# Patient Record
Sex: MALE | Race: BLACK OR AFRICAN AMERICAN | Employment: OTHER | URBAN - NONMETROPOLITAN AREA
[De-identification: names, ages, dates, MRNs, and addresses within clinical notes are randomized per-mention and may not be internally consistent; named-entity substitution may affect disease eponyms.]

---

## 2024-08-28 ENCOUNTER — HOSPITAL ENCOUNTER (EMERGENCY)
Facility: HOSPITAL | Age: 64
Discharge: HOME | End: 2024-08-28
Attending: EMERGENCY MEDICINE
Payer: OTHER GOVERNMENT

## 2024-08-28 VITALS
TEMPERATURE: 98.8 F | SYSTOLIC BLOOD PRESSURE: 176 MMHG | OXYGEN SATURATION: 100 % | HEART RATE: 80 BPM | DIASTOLIC BLOOD PRESSURE: 104 MMHG | RESPIRATION RATE: 18 BRPM

## 2024-08-28 DIAGNOSIS — S39.012A STRAIN OF LUMBAR REGION, INITIAL ENCOUNTER: Primary | ICD-10-CM

## 2024-08-28 PROCEDURE — 96372 THER/PROPH/DIAG INJ SC/IM: CPT

## 2024-08-28 PROCEDURE — 6360000002 HC RX W HCPCS: Performed by: EMERGENCY MEDICINE

## 2024-08-28 PROCEDURE — 99284 EMERGENCY DEPT VISIT MOD MDM: CPT

## 2024-08-28 PROCEDURE — 6370000000 HC RX 637 (ALT 250 FOR IP): Performed by: EMERGENCY MEDICINE

## 2024-08-28 RX ORDER — KETOROLAC TROMETHAMINE 15 MG/ML
15 INJECTION, SOLUTION INTRAMUSCULAR; INTRAVENOUS ONCE
Status: COMPLETED | OUTPATIENT
Start: 2024-08-28 | End: 2024-08-28

## 2024-08-28 RX ORDER — PREDNISONE 20 MG/1
40 TABLET ORAL DAILY
Qty: 8 TABLET | Refills: 0 | Status: SHIPPED | OUTPATIENT
Start: 2024-08-28 | End: 2024-09-01

## 2024-08-28 RX ORDER — ACETAMINOPHEN 500 MG
1000 TABLET ORAL
Status: COMPLETED | OUTPATIENT
Start: 2024-08-28 | End: 2024-08-28

## 2024-08-28 RX ORDER — PREDNISONE 20 MG/1
40 TABLET ORAL ONCE
Status: COMPLETED | OUTPATIENT
Start: 2024-08-28 | End: 2024-08-28

## 2024-08-28 RX ORDER — KETOROLAC TROMETHAMINE 10 MG/1
10 TABLET, FILM COATED ORAL EVERY 6 HOURS PRN
Qty: 20 TABLET | Refills: 0 | Status: SHIPPED | OUTPATIENT
Start: 2024-08-28

## 2024-08-28 RX ADMIN — PREDNISONE 40 MG: 20 TABLET ORAL at 12:42

## 2024-08-28 RX ADMIN — KETOROLAC TROMETHAMINE 15 MG: 15 INJECTION, SOLUTION INTRAMUSCULAR; INTRAVENOUS at 12:42

## 2024-08-28 RX ADMIN — ACETAMINOPHEN 1000 MG: 500 TABLET ORAL at 12:42

## 2024-08-28 ASSESSMENT — PAIN SCALES - GENERAL: PAINLEVEL_OUTOF10: 5

## 2024-08-28 ASSESSMENT — PAIN - FUNCTIONAL ASSESSMENT
PAIN_FUNCTIONAL_ASSESSMENT: PREVENTS OR INTERFERES SOME ACTIVE ACTIVITIES AND ADLS
PAIN_FUNCTIONAL_ASSESSMENT: 0-10

## 2024-08-28 ASSESSMENT — PAIN DESCRIPTION - PAIN TYPE: TYPE: ACUTE PAIN

## 2024-08-28 ASSESSMENT — PAIN DESCRIPTION - LOCATION: LOCATION: BACK

## 2024-08-28 ASSESSMENT — PAIN DESCRIPTION - FREQUENCY: FREQUENCY: CONTINUOUS

## 2024-08-28 ASSESSMENT — PAIN DESCRIPTION - ONSET: ONSET: ON-GOING

## 2024-08-28 NOTE — ED TRIAGE NOTES
PT reports lower back pain that is ongoing, pt denies any injury and reports he was seen at urgent care Monday and rx muscle relaxer. PT  reports pain is exacerbated by movement and when sitting in a certain position. PT reports had UA at urgent care which showed blood without bacteria.

## 2024-08-28 NOTE — ED PROVIDER NOTES
[facility] EMERGENCY DEPT  EMERGENCY DEPARTMENT HISTORY AND PHYSICAL EXAM      Date: 8/28/2024  Patient Name: Aden Sin  MRN: [id number]  Birthdate 1960  Date of evaluation: 8/28/2024  Provider: Nicholas Hsu MD   Note Started: 12:37 PM EDT 8/28/24    HISTORY OF PRESENT ILLNESS     Chief Complaint   Patient presents with    Back Pain       History Provided By: patient    HPI: Aden Sin is a 64 y.o. male with PMH remote prostate CA in remission presenting with back pain. Onset ~1 week ago, unclear when. No trauma to back. R-sided low back pain, nonradiating. No b/b incontinence or saddle anesthesia, LE weakness or numbness. Went to urgent care where given muscle relaxer and had a UA w/some blood but no bacteria, has f/up apt with urology. Was also on medrol dose pack for L 5th digit gout that has improved, finished course yesterday. Pain worse with movement at times but not always, still ambulatory and able to do ADLs. No F/C, hx of IVDU. Prostate CA in remission.    PAST MEDICAL HISTORY   Past Medical History:  Past Medical History:   Diagnosis Date    Prostate cancer (HCC)        Past Surgical History:  History reviewed. No pertinent surgical history.    Family History:  History reviewed. No pertinent family history.    Social History:       Allergies:  No Known Allergies    PCP: No primary care provider on file.    Current Meds:   Current Facility-Administered Medications   Medication Dose Route Frequency Provider Last Rate Last Admin    predniSONE (DELTASONE) tablet 40 mg  40 mg Oral Once Nicholas Hsu MD        acetaminophen (TYLENOL) tablet 1,000 mg  1,000 mg Oral NOW Nicholas Hsu MD        ketorolac (TORADOL) injection 15 mg  15 mg IntraMUSCular Once Nicholas Hsu MD         Current Outpatient Medications   Medication Sig Dispense Refill    predniSONE (DELTASONE) 20 MG tablet Take 2 tablets by mouth daily for 4 days 8 tablet 0    ketorolac (TORADOL) 10 MG tablet Take 1 tablet by mouth every 6 hours as

## 2024-08-28 NOTE — DISCHARGE INSTRUCTIONS
You were seen in the ER for your back pain. Thankfully, we did not find any dangerous causes for this pain, like signs of a broken bone, dangerous infection, or pressure on your spinal cord. This pain could be from be a muscle sprain, herniated disc, or a peripheral nerve issue, but it is not dangerous.    You should take tylenol or toradol (or ibuprofen if you cannot get toradol) for aches and pains for the next few days. You can also use a numbing patch on your back for 12 hours at a time. You can alternate tylenol/toradol every 3 hours so that you always have something on board. For instance, you can take tylenol at 9am, toradol at noon, tylenol again at 3pm and toradol again at 6pm. Make sure to stay moving around the house without over-exerting yourself, as laying still can make the pain worse as your muscles get stiff. Take in plenty of water to stay hydrated and follow up with your primary care doctor in the next few days. Return to the ER for any severe pain, weakness or numbness in your legs, numbness in your genitals, difficulty using the bathroom, or any other new or concerning symptoms.        Thank you for choosing our Emergency Department for your care.  It is our privilege to care for you in your time of need.  In the next several days, you may receive a survey via email or mailed to your home about your experience with our team.  We would greatly appreciate you taking a few minutes to complete the survey, as we use this information to learn what we have done well and what we could be doing better. Thank you for trusting us with your care!    Below you will find a list of your tests from today's visit.   Labs  No results found for this or any previous visit (from the past 12 hour(s)).    Radiologic Studies  No orders to display     ------------------------------------------------------------------------------------------------------------  The evaluation and treatment you received in the Emergency